# Patient Record
Sex: FEMALE | Race: WHITE | NOT HISPANIC OR LATINO | Employment: FULL TIME | URBAN - METROPOLITAN AREA
[De-identification: names, ages, dates, MRNs, and addresses within clinical notes are randomized per-mention and may not be internally consistent; named-entity substitution may affect disease eponyms.]

---

## 2022-09-08 RX ORDER — ZONISAMIDE 50 MG/1
CAPSULE ORAL 2 TIMES DAILY
COMMUNITY
End: 2023-07-14

## 2022-09-08 RX ORDER — PANTOPRAZOLE SODIUM 40 MG/1
40 TABLET, DELAYED RELEASE ORAL DAILY
COMMUNITY
End: 2022-10-27

## 2022-09-08 RX ORDER — ESCITALOPRAM OXALATE 10 MG/1
10 TABLET ORAL DAILY
COMMUNITY
End: 2022-10-27

## 2022-10-27 ENCOUNTER — OFFICE VISIT (OUTPATIENT)
Dept: NEUROLOGY | Facility: CLINIC | Age: 42
End: 2022-10-27
Payer: COMMERCIAL

## 2022-10-27 VITALS — WEIGHT: 203 LBS | DIASTOLIC BLOOD PRESSURE: 88 MMHG | SYSTOLIC BLOOD PRESSURE: 116 MMHG

## 2022-10-27 DIAGNOSIS — R20.2 PARESTHESIAS: ICD-10-CM

## 2022-10-27 DIAGNOSIS — G40.419: Primary | ICD-10-CM

## 2022-10-27 DIAGNOSIS — R41.9 COGNITIVE COMPLAINTS: ICD-10-CM

## 2022-10-27 DIAGNOSIS — G47.19 EXCESSIVE DAYTIME SLEEPINESS: ICD-10-CM

## 2022-10-27 PROCEDURE — 99205 PR OFFICE/OUTPT VISIT, NEW, LEVL V, 60-74 MIN: ICD-10-PCS | Mod: S$GLB,,, | Performed by: SPECIALIST

## 2022-10-27 PROCEDURE — 99999 PR PBB SHADOW E&M-EST. PATIENT-LVL II: CPT | Mod: PBBFAC,,, | Performed by: SPECIALIST

## 2022-10-27 PROCEDURE — 3074F PR MOST RECENT SYSTOLIC BLOOD PRESSURE < 130 MM HG: ICD-10-PCS | Mod: CPTII,S$GLB,, | Performed by: SPECIALIST

## 2022-10-27 PROCEDURE — 99999 PR PBB SHADOW E&M-EST. PATIENT-LVL II: ICD-10-PCS | Mod: PBBFAC,,, | Performed by: SPECIALIST

## 2022-10-27 PROCEDURE — 99205 OFFICE O/P NEW HI 60 MIN: CPT | Mod: S$GLB,,, | Performed by: SPECIALIST

## 2022-10-27 PROCEDURE — 3079F PR MOST RECENT DIASTOLIC BLOOD PRESSURE 80-89 MM HG: ICD-10-PCS | Mod: CPTII,S$GLB,, | Performed by: SPECIALIST

## 2022-10-27 PROCEDURE — 3079F DIAST BP 80-89 MM HG: CPT | Mod: CPTII,S$GLB,, | Performed by: SPECIALIST

## 2022-10-27 PROCEDURE — 1159F MED LIST DOCD IN RCRD: CPT | Mod: CPTII,S$GLB,, | Performed by: SPECIALIST

## 2022-10-27 PROCEDURE — 3074F SYST BP LT 130 MM HG: CPT | Mod: CPTII,S$GLB,, | Performed by: SPECIALIST

## 2022-10-27 PROCEDURE — 1159F PR MEDICATION LIST DOCUMENTED IN MEDICAL RECORD: ICD-10-PCS | Mod: CPTII,S$GLB,, | Performed by: SPECIALIST

## 2022-10-27 RX ORDER — ESCITALOPRAM OXALATE 20 MG/1
20 TABLET ORAL DAILY
COMMUNITY
Start: 2022-09-30 | End: 2023-01-17 | Stop reason: SDUPTHER

## 2022-10-27 NOTE — PROGRESS NOTES
"Subjective:       Patient ID: Letty Olmedo is a 42 y.o. female.    Chief Complaint: Pt ref by Dr. Blanca for sz, neuropathy (    HPI:            Here for sz, neuropathy eval//Pt reports onset of sz 6 yrs ago; taking zonisamide 50 mg AM, 100 mg qhs. Last known sz in summer 2021. Most recent EEG 9 months ago w Dr Lindsey. Intermittent numbness/tingling/pain to victor hugo feet, arms, upper chest. Episodes last 2 weeks feeling like "fainting". "Shadows" in peripheral vision every few months; visit w optho Feb 2021; intermittent "numbness sensation" to L eye. EMG BUE/BLE 1 yr ago w Dr. Lindsey. MRI Brain @ [facility] in  1 yr ago   Occas slurring and drops things     notes may also be on facesheet for HPI, ROS, and other sections     Review of Systems  Sleepy and naps daily; [place] 11   No sleep paralysis   When I fall it's like my brain and my muscles are fighting each other   Dreams: "things which happen in dreams actually happen in real life"   Recently dreamt water was coming out of her mouth profusely then she awakened and had slobber on her pillow     A prior two hour EEG had shown multiple episodes of seizure reportedly but her last routine EEG was reportedly normal   Vimpat was her first seizure drug           Social History     Socioeconomic History    Marital status:    Tobacco Use    Smoking status: Former     Types: Cigarettes    Smokeless tobacco: Never   Substance and Sexual Activity    Alcohol use: Yes    Drug use: Never     ----------------------------  Anxiety disorder, unspecified  Hashimoto's thyroiditis  Seizures      Current Outpatient Medications:     EScitalopram oxalate (LEXAPRO) 20 MG tablet, Take 20 mg by mouth once daily., Disp: , Rfl:     zonisamide (ZONEGRAN) 50 MG Cap, Take by mouth 2 (two) times daily., Disp: , Rfl:    Zns 50mg am 100mg pm   Objective:        Exam:   /88 (BP Location: Left arm, Patient Position: Sitting)   Wt 92.1 kg (203 lb)     General " Exam  _._unaccompanied  if accompanied, by__  body habitus_ There is no height or weight on file to calculate BMI.    mental status_alert and appropriate  oropharynx_Mallampati grade_  neck_  Heart__ RRR  extremities_  skin_    Neurological:  cortical function_    speech__  cranial nerves:  CN 2 VF_ok   fundi_ sharp discs   CN 3, 4, 6 EOMs_ok  CN 3, pupils_ok    CN 7_no lower face asymmetry  CN 8_hearing _ ok   CN 12 tongue_ok    Motor__ ok   tone:   muscle stretch reflexes__  Vib sens_  ok   Pin sens_  Plantars__ silent   tremor: _ nil   coordination: _  gait_ normal incl toes and heels and tandem   Romberg: neg     Neuroimaging:    EEG's: see above per her comments    Labs:      _._ new patient   ___ multiple issues/ diagnoses or problems  [if not enumerated in note then discussed in encounter but not documented]    complexity of data     __high ._mod   __ images and reports reviewed:  __ hx obtained from family or accompaniment:   __other studies reviewed   _._studies ordered __   __studies considered or discussed but not ordered __  _._DDx discussed __  atonic seiz vs cataplexy   Paresth due to zns vs other     Risks    _._high _mod   __ (possible or definite) neurodegenerative condition and inherent progression  __ (poss or def) autoimmune condition with possibility of flares or unexpected attack  _._ (poss or def) seiz d.o. with possib of recurr seiz's   __ cerebrovasc ds with risk of recurrence of stroke  _._ CNS meds (and/or) potentially high risk non CNS meds which may cause medical or behavioral side effects  _._ fall risk  _._ driving discussed   _._ diagnosis unclear or DDx wide making risk uncertain to high  __other:    MDM/Medical Decision Making     _._high  _moderate         Assessment/Plan:       Problem List Items Addressed This Visit          Neuro    Atonic seizures, intractable - Primary    Cognitive complaints       Other    Excessive daytime sleepiness    Paresthesias         Other comments/  follow up:        Imaging orders(if any):   Orders Placed This Encounter   Procedures    EEG,w/awake & drowsy record              Aim follow up _upcoming weeks   _Dr. PATRICK andersen EEG immed prior     Rashaad Velarde MD NALLELY

## 2022-11-02 ENCOUNTER — PROCEDURE VISIT (OUTPATIENT)
Dept: SLEEP MEDICINE | Facility: HOSPITAL | Age: 42
End: 2022-11-02
Attending: SPECIALIST
Payer: COMMERCIAL

## 2022-11-02 ENCOUNTER — OFFICE VISIT (OUTPATIENT)
Dept: NEUROLOGY | Facility: CLINIC | Age: 42
End: 2022-11-02
Payer: COMMERCIAL

## 2022-11-02 VITALS
BODY MASS INDEX: 27.5 KG/M2 | WEIGHT: 203 LBS | SYSTOLIC BLOOD PRESSURE: 118 MMHG | DIASTOLIC BLOOD PRESSURE: 82 MMHG | HEIGHT: 72 IN

## 2022-11-02 DIAGNOSIS — G40.419: Primary | ICD-10-CM

## 2022-11-02 DIAGNOSIS — R20.2 PARESTHESIAS: ICD-10-CM

## 2022-11-02 DIAGNOSIS — R41.9 COGNITIVE COMPLAINTS: ICD-10-CM

## 2022-11-02 DIAGNOSIS — G47.19 EXCESSIVE DAYTIME SLEEPINESS: ICD-10-CM

## 2022-11-02 DIAGNOSIS — G40.419: ICD-10-CM

## 2022-11-02 DIAGNOSIS — G47.33 OBSTRUCTIVE SLEEP APNEA SYNDROME: ICD-10-CM

## 2022-11-02 PROCEDURE — 3074F SYST BP LT 130 MM HG: CPT | Mod: CPTII,S$GLB,, | Performed by: SPECIALIST

## 2022-11-02 PROCEDURE — 1159F MED LIST DOCD IN RCRD: CPT | Mod: CPTII,S$GLB,, | Performed by: SPECIALIST

## 2022-11-02 PROCEDURE — 99999 PR PBB SHADOW E&M-EST. PATIENT-LVL III: CPT | Mod: PBBFAC,,, | Performed by: SPECIALIST

## 2022-11-02 PROCEDURE — 1159F PR MEDICATION LIST DOCUMENTED IN MEDICAL RECORD: ICD-10-PCS | Mod: CPTII,S$GLB,, | Performed by: SPECIALIST

## 2022-11-02 PROCEDURE — 99215 OFFICE O/P EST HI 40 MIN: CPT | Mod: S$GLB,,, | Performed by: SPECIALIST

## 2022-11-02 PROCEDURE — 3079F PR MOST RECENT DIASTOLIC BLOOD PRESSURE 80-89 MM HG: ICD-10-PCS | Mod: CPTII,S$GLB,, | Performed by: SPECIALIST

## 2022-11-02 PROCEDURE — 95819 EEG AWAKE AND ASLEEP: CPT

## 2022-11-02 PROCEDURE — 3079F DIAST BP 80-89 MM HG: CPT | Mod: CPTII,S$GLB,, | Performed by: SPECIALIST

## 2022-11-02 PROCEDURE — 95816 EEG AWAKE AND DROWSY: CPT | Mod: 26,,, | Performed by: SPECIALIST

## 2022-11-02 PROCEDURE — 99215 PR OFFICE/OUTPT VISIT, EST, LEVL V, 40-54 MIN: ICD-10-PCS | Mod: S$GLB,,, | Performed by: SPECIALIST

## 2022-11-02 PROCEDURE — 3008F BODY MASS INDEX DOCD: CPT | Mod: CPTII,S$GLB,, | Performed by: SPECIALIST

## 2022-11-02 PROCEDURE — 99999 PR PBB SHADOW E&M-EST. PATIENT-LVL III: ICD-10-PCS | Mod: PBBFAC,,, | Performed by: SPECIALIST

## 2022-11-02 PROCEDURE — 3008F PR BODY MASS INDEX (BMI) DOCUMENTED: ICD-10-PCS | Mod: CPTII,S$GLB,, | Performed by: SPECIALIST

## 2022-11-02 PROCEDURE — 95816 PR EEG,W/AWAKE & DROWSY RECORD: ICD-10-PCS | Mod: 26,,, | Performed by: SPECIALIST

## 2022-11-02 PROCEDURE — 3074F PR MOST RECENT SYSTOLIC BLOOD PRESSURE < 130 MM HG: ICD-10-PCS | Mod: CPTII,S$GLB,, | Performed by: SPECIALIST

## 2022-11-02 NOTE — PROCEDURES
EEG,w/awake & drowsy record    Date/Time: 2022 8:00 AM  Performed by: Rashaad Velarde MD  Authorized by: Rashaad Velarde MD     EEG REPORT    PATIENT: Letty Olmedo  : 1980    INTERPRETING PHYSICIAN: Rashaad Veladre     Reason for EEG: hx atonic seizures; other symptoms include episodes of speech dysf       Digital EEG reviewed.  Electrodes placed in customary 10-20 fashion.    Video recorded:   _._yes   __no     Duration of recordin   minutes     Patient  awake, drowsy, and asleep.     The resting posterior background activity consists of symmetrical background alpha activity.    Definitive lateralizing, focal, or epileptiform features were not present.  However, form fruste or equivocal features were questioned.     Activations:       HV performed and was not associated with abnormalities.       Photic performed and not associated with abnormalities.      Technical character: good     EKG: sinus    IMPRESSION: This is probably a normal awake, drowsy, and asleep EEG.  Equivocal features were present.     Comments:   The lack of focal or epileptiform features does not negate or exclude a diagnosis of seizure or epilepsy, as the sensitivity of interictal EEG may be < or equal to 50%.  An overnight psg with extended EEG montage is being sought.     Rashaad Velarde MD NALLELY    Current Outpatient Medications   Medication Instructions    EScitalopram oxalate (LEXAPRO) 20 mg, Oral, Daily    zonisamide (ZONEGRAN) 50 MG Cap Oral, 2 times daily

## 2022-11-02 NOTE — PROGRESS NOTES
Subjective:         Patient ID: Letty Olmedo is a 42 y.o. female.    Chief Complaint: sz EDS speech trouble follow up     HPI:           F/U Sz-Test Results.  (Pts boyfriend (Sukhwinder Dalton) is here with the pt today. Pt denies any new symptoms since last OV. Pt does drive. )  EEG today     notes may also be on facesheet for HPI, ROS, and other sections     Review of Systems  Last fall from possible seiz last summer   Boy says they were on hiking trip once and was debatable whether she tripped or fell   He intimates that she has stressful job but she does snore and he has heard her stop breathing             Social History     Socioeconomic History    Marital status:    Tobacco Use    Smoking status: Former     Types: Cigarettes    Smokeless tobacco: Never   Substance and Sexual Activity    Alcohol use: Yes    Drug use: Never         Current Outpatient Medications:     EScitalopram oxalate (LEXAPRO) 20 MG tablet, Take 20 mg by mouth once daily., Disp: , Rfl:     zonisamide (ZONEGRAN) 50 MG Cap, Take by mouth 2 (two) times daily., Disp: , Rfl:      Objective:      Exam  /82   Ht 6' (1.829 m)   Wt 92.1 kg (203 lb)   BMI 27.53 kg/m²     General:   if accompanied, by:_ Sukhwinder boyfriend 4 yrs duration   heart:   pharynx:    Neurological  Speech: normal   vis fields:  EOMs: normal   funduscopic:  Motor:   coord:   Gait: unaided     Neuroimaging:  Images and imaging reports reviewed.  My comments:       EEG: today equivocal possibly normal       Labs:    meds:      __._ multiple issues/ diagnoses or problems [if not enumerated in note then discussed in encounter but not documented]    complexity of data     _._high _mod   __ images and reports reviewed:  _._ hx obtained from family or accompaniment:   _._other studies reviewed EEG   _._studies ordered __   __studies considered or discussed but not ordered __  __DDx discussed __    Risks    _._high _mod   __ (possible or definite) neurodegenerative condition  and inherent progression  __ (poss or def) autoimmune condition with possibility of flares or unexpected attack  _._ (poss or def) seiz d.o. with possib of recurr seiz's   __ cerebrovasc ds with risk of recurrence of stroke  _._ CNS meds (and/or) potentially high risk non CNS meds which may cause medical or behavioral side effects  _._ fall risk  _._ driving discussed   __ diagnosis unclear or DDx wide making risk uncertain to high  __other:    MDM/Medical Decision Making     _._high  _moderate           Assessment/Plan:       Problem List Items Addressed This Visit          Neuro    Atonic seizures, intractable - Primary    Cognitive complaints       Other    Excessive daytime sleepiness    Obstructive sleep apnea syndrome       Other comments/ follow up:        Imaging orders (if any):   Orders Placed This Encounter   Procedures    Polysomnography 4 or more parameters           _._med modified  Stop am zonisamidie     Aim follow up _ weeks ahead after psg EEG montage     MD CRISTIANO LenzA

## 2022-11-16 ENCOUNTER — PROCEDURE VISIT (OUTPATIENT)
Dept: SLEEP MEDICINE | Facility: HOSPITAL | Age: 42
End: 2022-11-16
Attending: SPECIALIST
Payer: COMMERCIAL

## 2022-11-16 DIAGNOSIS — G47.33 OBSTRUCTIVE SLEEP APNEA SYNDROME: ICD-10-CM

## 2022-11-16 DIAGNOSIS — G47.19 EXCESSIVE DAYTIME SLEEPINESS: ICD-10-CM

## 2022-11-16 DIAGNOSIS — G40.419: ICD-10-CM

## 2022-11-16 DIAGNOSIS — R41.9 COGNITIVE COMPLAINTS: ICD-10-CM

## 2022-11-16 PROCEDURE — 95810 POLYSOM 6/> YRS 4/> PARAM: CPT | Mod: 26,,, | Performed by: SPECIALIST

## 2022-11-16 PROCEDURE — 95783 POLYSOM <6 YRS CPAP/BILVL: CPT

## 2022-11-16 PROCEDURE — 95810 PR POLYSOMNOGRAPHY, 4 OR MORE: ICD-10-PCS | Mod: 26,,, | Performed by: SPECIALIST

## 2022-11-16 PROCEDURE — 95811 POLYSOM 6/>YRS CPAP 4/> PARM: CPT

## 2022-11-23 ENCOUNTER — TELEPHONE (OUTPATIENT)
Dept: NEUROLOGY | Facility: CLINIC | Age: 42
End: 2022-11-23
Payer: COMMERCIAL

## 2022-11-28 NOTE — TELEPHONE ENCOUNTER
Spoke with pt; reviewed PSG results with her  AHI 1.9  Talha 91%  No seizure activity    Pt reports that night ( and most nights) she had vivid dreams and was talking during sleep.   Instructed her to take melatonin OTC  Pt reports taking 5mg Melatonin prior to sleep study  Instructed her to try up to 10mg Melatonin nightly    She has f/u w/ DrH 12/28

## 2022-11-28 NOTE — TELEPHONE ENCOUNTER
This pt has multiple other neurologic symptoms she came here for, and needs f/u with Cami. Let me know if f/u timeframe long and I can call her with results of PSG

## 2022-12-28 ENCOUNTER — OFFICE VISIT (OUTPATIENT)
Dept: NEUROLOGY | Facility: CLINIC | Age: 42
End: 2022-12-28
Payer: COMMERCIAL

## 2022-12-28 VITALS
WEIGHT: 203 LBS | BODY MASS INDEX: 27.5 KG/M2 | SYSTOLIC BLOOD PRESSURE: 112 MMHG | DIASTOLIC BLOOD PRESSURE: 78 MMHG | HEIGHT: 72 IN

## 2022-12-28 DIAGNOSIS — G40.419: Primary | ICD-10-CM

## 2022-12-28 DIAGNOSIS — R20.2 PARESTHESIAS: ICD-10-CM

## 2022-12-28 PROCEDURE — 3074F PR MOST RECENT SYSTOLIC BLOOD PRESSURE < 130 MM HG: ICD-10-PCS | Mod: CPTII,S$GLB,, | Performed by: SPECIALIST

## 2022-12-28 PROCEDURE — 1159F MED LIST DOCD IN RCRD: CPT | Mod: CPTII,S$GLB,, | Performed by: SPECIALIST

## 2022-12-28 PROCEDURE — 99999 PR PBB SHADOW E&M-EST. PATIENT-LVL III: ICD-10-PCS | Mod: PBBFAC,,, | Performed by: SPECIALIST

## 2022-12-28 PROCEDURE — 99999 PR PBB SHADOW E&M-EST. PATIENT-LVL III: CPT | Mod: PBBFAC,,, | Performed by: SPECIALIST

## 2022-12-28 PROCEDURE — 99215 OFFICE O/P EST HI 40 MIN: CPT | Mod: S$GLB,,, | Performed by: SPECIALIST

## 2022-12-28 PROCEDURE — 3078F DIAST BP <80 MM HG: CPT | Mod: CPTII,S$GLB,, | Performed by: SPECIALIST

## 2022-12-28 PROCEDURE — 3008F BODY MASS INDEX DOCD: CPT | Mod: CPTII,S$GLB,, | Performed by: SPECIALIST

## 2022-12-28 PROCEDURE — 1159F PR MEDICATION LIST DOCUMENTED IN MEDICAL RECORD: ICD-10-PCS | Mod: CPTII,S$GLB,, | Performed by: SPECIALIST

## 2022-12-28 PROCEDURE — 3078F PR MOST RECENT DIASTOLIC BLOOD PRESSURE < 80 MM HG: ICD-10-PCS | Mod: CPTII,S$GLB,, | Performed by: SPECIALIST

## 2022-12-28 PROCEDURE — 3008F PR BODY MASS INDEX (BMI) DOCUMENTED: ICD-10-PCS | Mod: CPTII,S$GLB,, | Performed by: SPECIALIST

## 2022-12-28 PROCEDURE — 99215 PR OFFICE/OUTPT VISIT, EST, LEVL V, 40-54 MIN: ICD-10-PCS | Mod: S$GLB,,, | Performed by: SPECIALIST

## 2022-12-28 PROCEDURE — 3074F SYST BP LT 130 MM HG: CPT | Mod: CPTII,S$GLB,, | Performed by: SPECIALIST

## 2022-12-28 NOTE — PROGRESS NOTES
Subjective:         Patient ID: Letty Olmedo is a 42 y.o. female.    Chief Complaint: sz    HPI:           2 month sz f/u (Here for 2 month sz f/u//Pt denies sz or sz like activity since last visit; taking zonisamide 50 mg BID.   Had sleep study done and results discussed w her via phone dialogue.   Did incr melatonin to 10 mg qhs and only awakens for restroom.   When bottom of L foot is touched, tingling to last 3 toes, denies pain.   Sporadic hip pain started yrs ago. )  Feels better since dec of zns from 50/100 to just 100hs     notes may also be on facesheet for HPI, ROS, and other sections     Review of Systems          Social History     Socioeconomic History    Marital status:    Tobacco Use    Smoking status: Former     Types: Cigarettes    Smokeless tobacco: Never   Substance and Sexual Activity    Alcohol use: Yes    Drug use: Never         Current Outpatient Medications:     EScitalopram oxalate (LEXAPRO) 20 MG tablet, Take 20 mg by mouth once daily., Disp: , Rfl:     zonisamide (ZONEGRAN) 50 MG Cap, Take by mouth 2 (two) times daily., Disp: , Rfl:      Objective:      Exam  /78 (BP Location: Left arm, Patient Position: Sitting)   Ht 6' (1.829 m)   Wt 92.1 kg (203 lb)   BMI 27.53 kg/m²     General:   if accompanied, by:_ h/partner  heart:   pharynx:    Neurological  Speech: normal   vis fields:  EOMs:  funduscopic:  Motor:   coord:   Gait: unassisted     Neuroimaging:  Images and imaging reports reviewed.  B MRI 2021     No parenchymal prob's   Stable pineal cyst 1.5cm       Labs:    meds:      __._ multiple issues/ diagnoses or problems [if not enumerated in note then discussed in encounter but not documented]    complexity of data     _._high _mod   _._ images and reports reviewed:  _._ hx obtained from family or accompaniment:   __other studies reviewed   __studies ordered __   __studies considered or discussed but not ordered __  __DDx discussed __    Risks    _._high _mod   __  (possible or definite) neurodegenerative condition and inherent progression  __ (poss or def) autoimmune condition with possibility of flares or unexpected attack  _._ (poss or def) seiz d.o. with possib of recurr seiz's   __ cerebrovasc ds with risk of recurrence of stroke  __ CNS meds (and/or) potentially high risk non CNS meds which may cause medical or behavioral side effects  __ fall risk  __ driving discussed   .__ diagnosis unclear or DDx wide making risk uncertain to high  __other:    MDM/Medical Decision Making     _._high  _moderate           Assessment/Plan:       Problem List Items Addressed This Visit          Neuro    Atonic seizures, maybe        Other    Paresthesias       Other comments/ follow up:      Decr zonisamide to just 50mg nightly     Orders Placed This Encounter   Procedures    EEG,w/awake & drowsy record    reviewed MRI images and old report she'd brought in  from old  mri; ok from seiz standpoint      __med modified    Aim follow up _1 month    Rashaad Velarde MD NALLELY

## 2023-01-06 ENCOUNTER — TELEPHONE (OUTPATIENT)
Dept: NEUROLOGY | Facility: CLINIC | Age: 43
End: 2023-01-06
Payer: COMMERCIAL

## 2023-01-06 NOTE — TELEPHONE ENCOUNTER
Patient is recently established patient with Dr. Velarde. Previous neurologist prescribed her lexapro for anxiety.     Asking if Dr. Velarde can take over medication and send refills to [facility] in [place]. Phone number for pharmacy is [phone]    Lexapro 20 mg tablet once daily.     Phone: [phone]

## 2023-01-17 RX ORDER — ESCITALOPRAM OXALATE 20 MG/1
20 TABLET ORAL DAILY
Qty: 30 TABLET | Refills: 11 | Status: SHIPPED | OUTPATIENT
Start: 2023-01-17 | End: 2024-02-08

## 2023-01-19 ENCOUNTER — PROCEDURE VISIT (OUTPATIENT)
Dept: SLEEP MEDICINE | Facility: HOSPITAL | Age: 43
End: 2023-01-19
Attending: SPECIALIST
Payer: COMMERCIAL

## 2023-01-19 ENCOUNTER — OFFICE VISIT (OUTPATIENT)
Dept: NEUROLOGY | Facility: CLINIC | Age: 43
End: 2023-01-19
Payer: COMMERCIAL

## 2023-01-19 VITALS
WEIGHT: 203 LBS | BODY MASS INDEX: 30.77 KG/M2 | HEIGHT: 68 IN | SYSTOLIC BLOOD PRESSURE: 122 MMHG | DIASTOLIC BLOOD PRESSURE: 86 MMHG

## 2023-01-19 DIAGNOSIS — Z87.898 HISTORY OF SEIZURES: Primary | ICD-10-CM

## 2023-01-19 DIAGNOSIS — G40.419: ICD-10-CM

## 2023-01-19 DIAGNOSIS — R20.2 PARESTHESIAS: ICD-10-CM

## 2023-01-19 PROCEDURE — 3008F PR BODY MASS INDEX (BMI) DOCUMENTED: ICD-10-PCS | Mod: CPTII,S$GLB,, | Performed by: SPECIALIST

## 2023-01-19 PROCEDURE — 95816 PR EEG,W/AWAKE & DROWSY RECORD: ICD-10-PCS | Mod: 26,,, | Performed by: SPECIALIST

## 2023-01-19 PROCEDURE — 3079F DIAST BP 80-89 MM HG: CPT | Mod: CPTII,S$GLB,, | Performed by: SPECIALIST

## 2023-01-19 PROCEDURE — 3079F PR MOST RECENT DIASTOLIC BLOOD PRESSURE 80-89 MM HG: ICD-10-PCS | Mod: CPTII,S$GLB,, | Performed by: SPECIALIST

## 2023-01-19 PROCEDURE — 95816 EEG AWAKE AND DROWSY: CPT

## 2023-01-19 PROCEDURE — 95816 EEG AWAKE AND DROWSY: CPT | Mod: 26,,, | Performed by: SPECIALIST

## 2023-01-19 PROCEDURE — 1159F PR MEDICATION LIST DOCUMENTED IN MEDICAL RECORD: ICD-10-PCS | Mod: CPTII,S$GLB,, | Performed by: SPECIALIST

## 2023-01-19 PROCEDURE — 3074F PR MOST RECENT SYSTOLIC BLOOD PRESSURE < 130 MM HG: ICD-10-PCS | Mod: CPTII,S$GLB,, | Performed by: SPECIALIST

## 2023-01-19 PROCEDURE — 99214 PR OFFICE/OUTPT VISIT, EST, LEVL IV, 30-39 MIN: ICD-10-PCS | Mod: S$GLB,,, | Performed by: SPECIALIST

## 2023-01-19 PROCEDURE — 99999 PR PBB SHADOW E&M-EST. PATIENT-LVL III: ICD-10-PCS | Mod: PBBFAC,,, | Performed by: SPECIALIST

## 2023-01-19 PROCEDURE — 3008F BODY MASS INDEX DOCD: CPT | Mod: CPTII,S$GLB,, | Performed by: SPECIALIST

## 2023-01-19 PROCEDURE — 99999 PR PBB SHADOW E&M-EST. PATIENT-LVL III: CPT | Mod: PBBFAC,,, | Performed by: SPECIALIST

## 2023-01-19 PROCEDURE — 99214 OFFICE O/P EST MOD 30 MIN: CPT | Mod: S$GLB,,, | Performed by: SPECIALIST

## 2023-01-19 PROCEDURE — 1159F MED LIST DOCD IN RCRD: CPT | Mod: CPTII,S$GLB,, | Performed by: SPECIALIST

## 2023-01-19 PROCEDURE — 3074F SYST BP LT 130 MM HG: CPT | Mod: CPTII,S$GLB,, | Performed by: SPECIALIST

## 2023-01-19 NOTE — PROCEDURES
"EEG,w/awake & drowsy record    Date/Time: 2023 10:00 AM  Performed by: Rashaad Velarde MD  Authorized by: Rashaad Velarde MD     EEG REPORT    PATIENT: Letty Olmedo  : 1980    DATE:     INTERPRETING PHYSICIAN: Rashaad Velarde     Reason for EEG:  episodes in the past of "falling slowly" felt to be atonic seizures   Having itching on low dose zonisamide 50mg hs        Digital EEG reviewed.  Electrodes placed in customary 10-20 fashion.    Video recorded:   _._yes   __no     Duration of recordin minutes     Patient  awake, drowsy.     The resting posterior background activity consists of symmetrical background alpha activity; not well developed or maintained but intermittently present.    Lateralizing, focal, or epileptiform features were not present.    Activations:          HV performed and was not associated with abnormalities.       Photic performed and not associated with abnormalities.      Technical character: good      EKG: nsr    IMPRESSION: This is a normal awake, drowsy EEG.     Comments:   The lack of focal or epileptiform features does not negate or exclude a diagnosis of seizure or epilepsy, as the sensitivity of interictal EEG may be < or equal to 50%.    Rashaad Velarde MD NALLELY    Current Outpatient Medications   Medication Instructions    EScitalopram oxalate (LEXAPRO) 20 mg, Oral, Daily    zonisamide (ZONEGRAN) 50 MG Cap nightly        "

## 2023-01-19 NOTE — PROGRESS NOTES
"Subjective:         Patient ID: Letty Olmedo is a 42 y.o. female.    Chief Complaint: sz    HPI:         no spells   Itching and twitching (twitching mostly L eyelid)   occas rox   Would like to be off medication if unclear she needs it     notes may also be on facesheet for HPI, ROS, and other sections     Prior visit:  2 month sz f/u (Here for 2 month sz f/u//Pt denies sz or sz like activity since last visit; taking zonisamide 50 mg BID.   Had sleep study done and results discussed w her via phone dialogue.   Did incr melatonin to 10 mg qhs and only awakens for restroom.   When bottom of L foot is touched, tingling to last 3 toes, denies pain.   Sporadic hip pain started yrs ago. )  Feels better since dec of zns from 50/100 to just 100hs     Review of Systems          Social History     Socioeconomic History    Marital status:    Tobacco Use    Smoking status: Former     Types: Cigarettes    Smokeless tobacco: Never   Substance and Sexual Activity    Alcohol use: Yes    Drug use: Never         Current Outpatient Medications:     EScitalopram oxalate (LEXAPRO) 20 MG tablet, Take 1 tablet (20 mg total) by mouth once daily., Disp: 30 tablet, Rfl: 11    zonisamide (ZONEGRAN) 50 MG Cap, Take by mouth 2 (two) times daily., Disp: , Rfl:      Objective:      Exam  /86   Ht 5' 8" (1.727 m)   Wt 92.1 kg (203 lb)   BMI 30.87 kg/m²     General:   if accompanied, by:_   heart:   pharynx:    Neurological  Speech: normal   vis fields:  EOMs:  funduscopic:  Motor:   coord:   Gait: unassisted     Neuroimaging:  Images and imaging reports reviewed last visit.  B MRI 2021   No parenchymal prob's   Stable pineal cyst 1.5cm     Today's EEG: normal   Prior routine EEG here equivocal   Prior psg here with EEG was ok     Labs:    meds:          Assessment/Plan:       Problem List Items Addressed This Visit          Neuro    Atonic seizures, doubtful        Other    Paresthesias       Other comments/ follow up:    stop " zonisamide   Report recurr spells   If recurr spells likely will ask for another EEG at fu   Considered restricting her driving but did not     Aim follow up  6w    MD CRISTIANO LenzA

## 2023-01-25 ENCOUNTER — TELEPHONE (OUTPATIENT)
Dept: OBSTETRICS AND GYNECOLOGY | Facility: CLINIC | Age: 43
End: 2023-01-25
Payer: COMMERCIAL

## 2023-01-25 NOTE — TELEPHONE ENCOUNTER
----- Message from Bibiana Holland sent at 2023  9:21 AM CST -----  Regarding: appt access  Contact: pt  Type:  Sooner Apoointment Request    Caller is requesting a sooner appointment.  Caller declined first available appointment listed below.  Caller will not accept being placed on the waitlist and is requesting a message be sent to doctor.  Name of Caller: Letty Olmedo   When is the first available appointment? 23  Symptoms: NP/bruising around  scare, break through bleeding, abd pain   Would the patient rather a call back or a response via [facility]?  Call back   Best Call Back Number: [phone]  Additional Information:  Pt last  was 2017.

## 2023-01-25 NOTE — TELEPHONE ENCOUNTER
Spoke to new patient and scheduled next available. Advised to contact pcp or urgent care for current concerns.

## 2023-07-14 ENCOUNTER — OFFICE VISIT (OUTPATIENT)
Dept: OBSTETRICS AND GYNECOLOGY | Facility: CLINIC | Age: 43
End: 2023-07-14
Payer: COMMERCIAL

## 2023-07-14 VITALS
WEIGHT: 215 LBS | HEIGHT: 68 IN | SYSTOLIC BLOOD PRESSURE: 135 MMHG | BODY MASS INDEX: 32.58 KG/M2 | HEART RATE: 64 BPM | DIASTOLIC BLOOD PRESSURE: 82 MMHG

## 2023-07-14 DIAGNOSIS — Z01.419 WELL WOMAN EXAM WITH ROUTINE GYNECOLOGICAL EXAM: Primary | ICD-10-CM

## 2023-07-14 DIAGNOSIS — N81.6 RECTOCELE: ICD-10-CM

## 2023-07-14 DIAGNOSIS — Z12.31 BREAST CANCER SCREENING BY MAMMOGRAM: ICD-10-CM

## 2023-07-14 DIAGNOSIS — N39.3 SUI (STRESS URINARY INCONTINENCE, FEMALE): ICD-10-CM

## 2023-07-14 DIAGNOSIS — R10.2 PELVIC PAIN: ICD-10-CM

## 2023-07-14 DIAGNOSIS — N92.0 MENORRHAGIA WITH REGULAR CYCLE: ICD-10-CM

## 2023-07-14 PROCEDURE — 99386 PREV VISIT NEW AGE 40-64: CPT | Mod: S$GLB,,, | Performed by: OBSTETRICS & GYNECOLOGY

## 2023-07-14 PROCEDURE — 3075F PR MOST RECENT SYSTOLIC BLOOD PRESS GE 130-139MM HG: ICD-10-PCS | Mod: CPTII,S$GLB,, | Performed by: OBSTETRICS & GYNECOLOGY

## 2023-07-14 PROCEDURE — 3075F SYST BP GE 130 - 139MM HG: CPT | Mod: CPTII,S$GLB,, | Performed by: OBSTETRICS & GYNECOLOGY

## 2023-07-14 PROCEDURE — 3079F PR MOST RECENT DIASTOLIC BLOOD PRESSURE 80-89 MM HG: ICD-10-PCS | Mod: CPTII,S$GLB,, | Performed by: OBSTETRICS & GYNECOLOGY

## 2023-07-14 PROCEDURE — 3008F PR BODY MASS INDEX (BMI) DOCUMENTED: ICD-10-PCS | Mod: CPTII,S$GLB,, | Performed by: OBSTETRICS & GYNECOLOGY

## 2023-07-14 PROCEDURE — 1159F PR MEDICATION LIST DOCUMENTED IN MEDICAL RECORD: ICD-10-PCS | Mod: CPTII,S$GLB,, | Performed by: OBSTETRICS & GYNECOLOGY

## 2023-07-14 PROCEDURE — 3008F BODY MASS INDEX DOCD: CPT | Mod: CPTII,S$GLB,, | Performed by: OBSTETRICS & GYNECOLOGY

## 2023-07-14 PROCEDURE — 3079F DIAST BP 80-89 MM HG: CPT | Mod: CPTII,S$GLB,, | Performed by: OBSTETRICS & GYNECOLOGY

## 2023-07-14 PROCEDURE — 1159F MED LIST DOCD IN RCRD: CPT | Mod: CPTII,S$GLB,, | Performed by: OBSTETRICS & GYNECOLOGY

## 2023-07-14 PROCEDURE — 99386 PR PREVENTIVE VISIT,NEW,40-64: ICD-10-PCS | Mod: S$GLB,,, | Performed by: OBSTETRICS & GYNECOLOGY

## 2023-07-14 NOTE — PROGRESS NOTES
Subjective:       Patient ID: Letty Olmedo is a 43 y.o. female.    Chief Complaint:  Well Woman (PATIENT STATES SHE HAD AN ULTRASOUND WITH PREVIOUS PROVIDER THAT SHOWED AN OVARIAN CYST. SHE HAS BEEN HAVING SOME RIGHT SIDED PAIN. /UNUSUAL DISCHARGE A COUPLE TIMES A MONTH. CLOTS WITH CYCLES. )      History of Present Illness  She is doing well.  No new health issues,  No abnormal bleeding, No GI concerns,   She has very large clots the size of a tennis ball.          she feels deep dyspareunia. She feels like he is hitting something.   She has SWAPNIL with activity.  She can hold her urine.    She had a tubal with her last c section.  Her first child was     she had an u/s a while ago that showed an ovarian cyst.      She has a lot of pain on the right side and this has been on going for  a few months.   She does exercise with  spinning.   .   HPI      GYN & OB History  Patient's last menstrual period was 2023.     Date of Last Pap: No result found    OB History   No obstetric history on file.       Review of Systems  Review of Systems   Constitutional:  Negative for activity change.   Eyes:  Negative for visual disturbance.   Respiratory:  Negative for shortness of breath.    Cardiovascular:  Negative for chest pain.   Gastrointestinal:  Negative for abdominal pain.   Genitourinary:  Positive for bladder incontinence, dyspareunia, menorrhagia and pelvic pain. Negative for vaginal bleeding.        No abnormal vaginal bleeding   Musculoskeletal:  Negative for back pain.   Integumentary:  Negative for rash and breast mass.   Neurological:  Negative for numbness.   Psychiatric/Behavioral:          No mood disturbance or changes    Breast: Negative for mass          Objective:    Physical Exam:   Constitutional: She is oriented to person, place, and time. She appears well-developed. She is cooperative.    HENT:   Head: Normocephalic.     Neck: Trachea normal. No thyromegaly present.    Cardiovascular:   Normal rate, regular rhythm and normal heart sounds.             Pulmonary/Chest: Effort normal and breath sounds normal. Right breast exhibits no mass, no nipple discharge and no skin change. Left breast exhibits no mass, no nipple discharge and no skin change.        Abdominal: Soft. There is no abdominal tenderness. There is no rebound and no guarding.     Genitourinary:    Vagina, uterus and left adnexa normal.      Pelvic exam was performed with patient supine.   Labial bartholins normal.There is no lesion on the right labia. There is no lesion on the left labia. Cervix is normal. Right adnexum displays tenderness. Right adnexum displays no mass. Left adnexum displays no mass and no tenderness. Cervix exhibits no discharge. Uterus is not enlarged and not tender. Urethral Meatus exhibits: prolapsed (she does have SWAPNIL with cough that i noted)               Lymphadenopathy:        Head (right side): No submental and no submandibular adenopathy present.        Head (left side): No submental and no submandibular adenopathy present.     She has no cervical adenopathy.    Neurological: She is alert and oriented to person, place, and time.    Skin: Skin is warm.    Psychiatric: She has a normal mood and affect. Her speech is normal and behavior is normal. Thought content normal.        Assessment:        1. Well woman exam with routine gynecological exam    2. Breast cancer screening by mammogram    3. Menorrhagia with regular cycle    4. Pelvic pain    5. SWAPNIL (stress urinary incontinence, female)    6. Rectocele                 Plan:         [facility]   bilateral fallopian tube.   Mid urethra sling    post repair and cystoscopy       Pap discussed will return for her annual     Pt is aware we call all results. If she does not hear from our office regarding her result within a week of having a study or procedure performed she is to call the office so that we can research the result for her.  Birth control  discussed  Chaperone was present

## 2023-07-17 ENCOUNTER — PROCEDURE VISIT (OUTPATIENT)
Dept: OBSTETRICS AND GYNECOLOGY | Facility: CLINIC | Age: 43
End: 2023-07-17
Payer: COMMERCIAL

## 2023-07-17 ENCOUNTER — TELEPHONE (OUTPATIENT)
Dept: OBSTETRICS AND GYNECOLOGY | Facility: CLINIC | Age: 43
End: 2023-07-17
Payer: COMMERCIAL

## 2023-07-17 DIAGNOSIS — N81.6 RECTOCELE: ICD-10-CM

## 2023-07-17 DIAGNOSIS — R10.2 PELVIC PAIN: ICD-10-CM

## 2023-07-17 DIAGNOSIS — N39.3 SUI (STRESS URINARY INCONTINENCE, FEMALE): ICD-10-CM

## 2023-07-17 DIAGNOSIS — N92.0 MENORRHAGIA WITH REGULAR CYCLE: ICD-10-CM

## 2023-07-17 LAB — Lab: NORMAL

## 2023-07-17 PROCEDURE — 76830 TRANSVAGINAL US NON-OB: CPT | Mod: S$GLB,,, | Performed by: OBSTETRICS & GYNECOLOGY

## 2023-07-17 PROCEDURE — 76830 US OB/GYN PROCEDURE (VIEWPOINT): ICD-10-PCS | Mod: S$GLB,,, | Performed by: OBSTETRICS & GYNECOLOGY

## 2023-07-17 NOTE — TELEPHONE ENCOUNTER
----- Message from Jaren Dean sent at 7/17/2023  2:10 PM CDT -----  Contact: Pt  Pt is calling to see when she should expect her US results and can be reached at [phone]//thanks/dbw

## 2023-07-17 NOTE — TELEPHONE ENCOUNTER
Spoke to patient about results. She is wanting to move forward with surgery.     PLEASE ORDER SURGERY.

## 2023-08-16 ENCOUNTER — TELEPHONE (OUTPATIENT)
Dept: OBSTETRICS AND GYNECOLOGY | Facility: CLINIC | Age: 43
End: 2023-08-16
Payer: COMMERCIAL

## 2023-08-16 NOTE — TELEPHONE ENCOUNTER
Patient requested a call back regarding her cycle. Called patient, she states that her cycle is about 2 weeks late and she had night sweats last night. Reassured patient that these symptoms are not concerning and to continue to monitor at this time. She is scheduled for a hysterectomy in November. Advised that if the night sweats continue or she develops any further menopausal symptoms, there are hormonal and non hormonal medication options. Patient voiced understanding.

## 2023-08-23 ENCOUNTER — TELEPHONE (OUTPATIENT)
Dept: OBSTETRICS AND GYNECOLOGY | Facility: CLINIC | Age: 43
End: 2023-08-23
Payer: COMMERCIAL

## 2023-08-23 NOTE — TELEPHONE ENCOUNTER
----- Message from Marlene Louie sent at 8/23/2023  1:28 PM CDT -----  Contact: self  Pt is calling in regards to 3 week late cycle . Please call pt at [phone] .

## 2023-08-23 NOTE — TELEPHONE ENCOUNTER
Phone call returned.  A child answered the phone and I did not get to speak to the patient  I will attempt to reach tomorrow morning.

## 2023-08-24 NOTE — TELEPHONE ENCOUNTER
Please advise:    Patient called reporting  she is three weeks late for her cycle.  She is scheduled for a Hysterectomy in November. She is interested in hormone medication.     Allergy: Motrin    [facility]       August 16, 2023   Trev Vergara LPN  [facility]   8/16/23 10:54 AM   Note   Patient requested a call back regarding her cycle. Called patient, she states that her cycle is about 2 weeks late and she had night sweats last night. Reassured patient that these symptoms are not concerning and to continue to monitor at this time. She is scheduled for a hysterectomy in November. Advised that if the night sweats continue or she develops any further menopausal symptoms, there are hormonal and non hormonal medication options. Patient voiced understanding

## 2023-08-25 DIAGNOSIS — N91.2 AMENORRHEA: Primary | ICD-10-CM

## 2023-08-25 LAB
FSH: 6.15 MIU/ML
LH: 6.37 MIU/ML
T4, FREE: 1.12 NG/DL (ref 0.93–1.7)
TSH SERPL DL<=0.005 MIU/L-ACNC: 1.3 UIU/ML (ref 0.27–4.2)

## 2023-08-25 RX ORDER — MEDROXYPROGESTERONE ACETATE 10 MG/1
10 TABLET ORAL DAILY
Qty: 15 TABLET | Refills: 0 | Status: SHIPPED | OUTPATIENT
Start: 2023-08-25 | End: 2023-09-11

## 2023-08-25 NOTE — TELEPHONE ENCOUNTER
Phone call returned to patient  Dr. Yanes reviewed her message.  He advised starting Provera and Labs ordered TSH, Free T4  FSH and LH  Patient advised to have labs drawn at [facility]  If she does not hear from our office in a couple of days please reach out to us   Patient acknowledged with verbal understanding

## 2023-08-30 ENCOUNTER — TELEPHONE (OUTPATIENT)
Dept: OBSTETRICS AND GYNECOLOGY | Facility: CLINIC | Age: 43
End: 2023-08-30
Payer: COMMERCIAL

## 2023-08-30 NOTE — TELEPHONE ENCOUNTER
----- Message from Jaren Dean sent at 8/30/2023  9:01 AM CDT -----  Contact: Pt  Pt is calling rg her cycle still hasn't come and was supposed to come on at the 1st of the mnth and should she still tke the hormones that were given to the pt and can be reached at [phone]//thanks/dbw

## 2023-08-30 NOTE — TELEPHONE ENCOUNTER
Spoke to patient. Advised that there are a lot of factors that can affect her menstrual cycle. She is scheduled for her hysterectomy in 1 month.

## 2023-09-09 DIAGNOSIS — N91.2 AMENORRHEA: ICD-10-CM

## 2023-09-11 RX ORDER — MEDROXYPROGESTERONE ACETATE 10 MG/1
TABLET ORAL
Qty: 15 TABLET | Refills: 0 | Status: SHIPPED | OUTPATIENT
Start: 2023-09-11 | End: 2023-09-29

## 2023-09-11 NOTE — TELEPHONE ENCOUNTER
Patient requesting refill of medication. Allergies reviewed. Medication pending. Pharmacy up to date. Please review.

## 2023-09-29 DIAGNOSIS — N91.2 AMENORRHEA: ICD-10-CM

## 2023-09-29 RX ORDER — MEDROXYPROGESTERONE ACETATE 10 MG/1
TABLET ORAL
Qty: 15 TABLET | Refills: 0 | Status: SHIPPED | OUTPATIENT
Start: 2023-09-29 | End: 2023-11-15

## 2023-10-09 ENCOUNTER — OFFICE VISIT (OUTPATIENT)
Dept: NEUROLOGY | Facility: CLINIC | Age: 43
End: 2023-10-09
Payer: COMMERCIAL

## 2023-10-09 ENCOUNTER — TELEPHONE (OUTPATIENT)
Dept: SLEEP MEDICINE | Facility: HOSPITAL | Age: 43
End: 2023-10-09
Payer: COMMERCIAL

## 2023-10-09 VITALS
SYSTOLIC BLOOD PRESSURE: 122 MMHG | BODY MASS INDEX: 32.58 KG/M2 | HEIGHT: 68 IN | DIASTOLIC BLOOD PRESSURE: 78 MMHG | WEIGHT: 215 LBS

## 2023-10-09 DIAGNOSIS — Z87.898 HISTORY OF SEIZURE: Primary | ICD-10-CM

## 2023-10-09 DIAGNOSIS — Z87.898 HISTORY OF SEIZURES: Primary | ICD-10-CM

## 2023-10-09 PROCEDURE — 3074F SYST BP LT 130 MM HG: CPT | Mod: CPTII,S$GLB,, | Performed by: NURSE PRACTITIONER

## 2023-10-09 PROCEDURE — 3078F DIAST BP <80 MM HG: CPT | Mod: CPTII,S$GLB,, | Performed by: NURSE PRACTITIONER

## 2023-10-09 PROCEDURE — 1159F PR MEDICATION LIST DOCUMENTED IN MEDICAL RECORD: ICD-10-PCS | Mod: CPTII,S$GLB,, | Performed by: NURSE PRACTITIONER

## 2023-10-09 PROCEDURE — 3078F PR MOST RECENT DIASTOLIC BLOOD PRESSURE < 80 MM HG: ICD-10-PCS | Mod: CPTII,S$GLB,, | Performed by: NURSE PRACTITIONER

## 2023-10-09 PROCEDURE — 99214 PR OFFICE/OUTPT VISIT, EST, LEVL IV, 30-39 MIN: ICD-10-PCS | Mod: S$GLB,,, | Performed by: NURSE PRACTITIONER

## 2023-10-09 PROCEDURE — 3074F PR MOST RECENT SYSTOLIC BLOOD PRESSURE < 130 MM HG: ICD-10-PCS | Mod: CPTII,S$GLB,, | Performed by: NURSE PRACTITIONER

## 2023-10-09 PROCEDURE — 99999 PR PBB SHADOW E&M-EST. PATIENT-LVL III: ICD-10-PCS | Mod: PBBFAC,,, | Performed by: NURSE PRACTITIONER

## 2023-10-09 PROCEDURE — 3008F PR BODY MASS INDEX (BMI) DOCUMENTED: ICD-10-PCS | Mod: CPTII,S$GLB,, | Performed by: NURSE PRACTITIONER

## 2023-10-09 PROCEDURE — 3008F BODY MASS INDEX DOCD: CPT | Mod: CPTII,S$GLB,, | Performed by: NURSE PRACTITIONER

## 2023-10-09 PROCEDURE — 99999 PR PBB SHADOW E&M-EST. PATIENT-LVL III: CPT | Mod: PBBFAC,,, | Performed by: NURSE PRACTITIONER

## 2023-10-09 PROCEDURE — 1159F MED LIST DOCD IN RCRD: CPT | Mod: CPTII,S$GLB,, | Performed by: NURSE PRACTITIONER

## 2023-10-09 PROCEDURE — 99214 OFFICE O/P EST MOD 30 MIN: CPT | Mod: S$GLB,,, | Performed by: NURSE PRACTITIONER

## 2023-10-09 NOTE — PROGRESS NOTES
"Neurology  Established Patient   SUBJECTIVE:    Patient ID: Letty Olmedo , 43 y.o.    Past Medical History:   Diagnosis Date    Anxiety disorder, unspecified     Hashimoto's thyroiditis     Seizures        Past Surgical History:   Procedure Laterality Date     SECTION      COLONOSCOPY      HEMORRHOID SURGERY      TONSILLECTOMY      TUBAL LIGATION      UMBILICAL HERNIA REPAIR         Review of patient's allergies indicates:   Allergen Reactions    Motrin [ibuprofen] Hives       Chief Complaint: History of seizure f/u (seizure med stopped in 2023)    History of Present Illness:     Seizure follow up    Has been off Zonisamide since 2023. Denies seizure-like activity    Over the past 1-2 weeks:  Having episodes of lightheadedness that last 1-2 seconds at a time. Feels its stress related  Denies loss of consciousness  Occurs "all day every day"     Pt scheduled to have a Hysterectomy on 2023.    Admits stressors    Admits difficulty falling asleep. Takes Melatonin 10mg nightly     Review of Systems - as per HPI, otherwise a balanced 10 systems review is negative.      Current Medications:  Current Outpatient Medications   Medication Instructions    EScitalopram oxalate (LEXAPRO) 20 mg, Oral, Daily    medroxyPROGESTERone (PROVERA) 10 MG tablet TAKE 1 TABLET(10 MG) BY MOUTH EVERY DAY FOR 15 DAYS         OBJECTIVE:    Vitals:  /78   Ht 5' 8" (1.727 m)   Wt 97.5 kg (215 lb)   BMI 32.69 kg/m²      Physical Exam:  Constitutional  she appears well-developed and well-nourished. she is well groomed.    Appearance - well appearing, no apparent distress, unassisted  Heart - RRR auscultated without murmur  Lungs - CTA   Skin- no obvious lesions noted    Neurologic  Cortical function - The patient is alert, attentive, and oriented  Speech - clear   Cranial nerves:  CN 3, 4, 6 EOMs - normal. No ptosis or lateral gaze deviation  CN 7 - no face asymmetry; normal eye closure and smile  CN 8 - hearing is " grossly normal  Motor - grossly normal  Gait - unassisted; posture upright. gait is steady with normal steps    Review of Data:      Neuroimaging:  Images and imaging reports reviewed last visit.  B MRI 2021   No parenchymal prob's   Stable pineal cyst 1.5cm      1/19/23 EEG: normal   Prior routine EEG here equivocal   Prior psg here with EEG was ok     11/2022- Routine EEG nl    Labs:  Refill on 08/25/2023   Component Date Value Ref Range Status    TSH 08/25/2023 1.30  0.27 - 4.20 uIU/mL Final    T4, Free 08/25/2023 1.12  0.93 - 1.70 ng/dL Final    LH 08/25/2023 6.37  mIU/mL Final    FSH 08/25/2023 6.15  mIU/mL Final   Office Visit on 07/14/2023   Component Date Value Ref Range Status    Disclaimer 07/14/2023    Final                    Value:DISCLAIMER: Annual Pap smears are the best means now available for the early detection of cervical cancer.  There is no guarantee, however, that a particular Pap smear will  diseased cells that are present.  Thus, false negative reports may   occasionally occur.            ASSESSMENT /PLAN:    Problem List Items Addressed This Visit          Neuro    History of seizures - Primary    Will order 24 hour EEG (since having these episodes daily)    Follow up TBD           Questions and concerns were sought and answered to the patient's stated verbal satisfaction.    The patient verbalizes understanding and agreement with the above stated treatment plan.   Dr. Velarde was available during today's encounter.     Items discussed include acute and/or chronic neurological, sleep, or other issues and their attendant differential diagnoses.  Potential for additional testing, treatment options, and prognosis also discussed.    _*__single dx ___multiple issues/ diagnoses  ___ low __ mod _*__ high complexity of data  ___low _*_mod ___ high risks     Medical Decision Making (MDM) used for CPT choice:  ___low  _*__moderate  ____high        ROMEO Bush  [facility]  [place]  [phone]

## 2023-10-10 ENCOUNTER — PROCEDURE VISIT (OUTPATIENT)
Dept: SLEEP MEDICINE | Facility: HOSPITAL | Age: 43
End: 2023-10-10
Attending: NURSE PRACTITIONER
Payer: COMMERCIAL

## 2023-10-10 DIAGNOSIS — R56.9 CONVULSION: Primary | ICD-10-CM

## 2023-10-10 DIAGNOSIS — G40.419: ICD-10-CM

## 2023-10-10 DIAGNOSIS — Z87.898 HISTORY OF SEIZURE: ICD-10-CM

## 2023-10-10 DIAGNOSIS — R20.2 PARESTHESIAS: ICD-10-CM

## 2023-10-10 DIAGNOSIS — R41.9 COGNITIVE COMPLAINTS: ICD-10-CM

## 2023-10-10 DIAGNOSIS — G47.19 EXCESSIVE DAYTIME SLEEPINESS: ICD-10-CM

## 2023-10-10 PROCEDURE — 95708 EEG WO VID EA 12-26HR UNMNTR: CPT

## 2023-10-10 PROCEDURE — 95719 PR EEG, W/O VIDEO, CONT RECORD, I&R, >12<26 HRS: ICD-10-PCS | Mod: ,,, | Performed by: SPECIALIST

## 2023-10-10 PROCEDURE — 95719 EEG PHYS/QHP EA INCR W/O VID: CPT | Mod: ,,, | Performed by: SPECIALIST

## 2023-10-19 NOTE — PROCEDURES
Ambulatory EEG    Date/Time: 10/10/2023 1:30 PM    Performed by: Rashaad Velarde MD  Authorized by: Jillian Beck, [facility]      Ambulatory / Extended EEG Report    PATIENT: Letty Olmedo  : 1980    INTERPRETING PHYSICIAN: Rashaad Velarde     Reason for EEG: seizures, atonic seizures, paresthesias, cognitive complaints        Digital prolonged ambulatory EEG without video reviewed.  Electrodes placed in customary 10-20 fashion.    Duration of recordin  hours  53   minutes.     Patient  awake, drowsy, and asleep.    Sleep episodes are as follows:  Naps:  Nocturnal sleep: 1008p to 0640a    The resting posterior background activity consists of symmetrical background alpha activity.    Lateralizing, focal, or epileptiform features were not present.    Activations (HV photic stimulation) not performed.     Technical character:  Electrode artifact in the last couple of hours of the study but the patient had already been awake.  Additionally, patient button pushes number 55 I suspect some of these occurred while she was exercising on her Peloton.     EKG:     Patient kept a diary.    __Pertinent entries include:  _*_No pertinent clinical symptoms recorded.     Patient alert button pushed.  EEG at these times showed:  Either normal EEG or only artifact        IMPRESSION: This is a normal awake, drowsy, and asleep extended/ 24 hour ambulatory EEG.     Comments:   The lack of focal or epileptiform features, even on an ambulatory tracing, does not negate or exclude a diagnosis of seizure or epilepsy.    Rashaad Velarde MD [facility]    Current Outpatient Medications   Medication Instructions    EScitalopram oxalate (LEXAPRO) 20 mg, Oral, Daily    medroxyPROGESTERone (PROVERA) 10 MG tablet TAKE 1 TABLET(10 MG) BY MOUTH EVERY DAY FOR 15 DAYS

## 2023-10-20 ENCOUNTER — TELEPHONE (OUTPATIENT)
Dept: NEUROLOGY | Facility: CLINIC | Age: 43
End: 2023-10-20
Payer: COMMERCIAL

## 2023-10-20 NOTE — TELEPHONE ENCOUNTER
Patient requesting call with results for 24 hr EEG. States saw results were uploaded in portal but unable to view.     Results available in Epic.    Phone: [phone]

## 2023-10-23 ENCOUNTER — PATIENT MESSAGE (OUTPATIENT)
Dept: NEUROLOGY | Facility: CLINIC | Age: 43
End: 2023-10-23
Payer: COMMERCIAL

## 2023-11-02 ENCOUNTER — OFFICE VISIT (OUTPATIENT)
Dept: OBSTETRICS AND GYNECOLOGY | Facility: CLINIC | Age: 43
End: 2023-11-02
Payer: COMMERCIAL

## 2023-11-02 VITALS
SYSTOLIC BLOOD PRESSURE: 132 MMHG | HEART RATE: 69 BPM | HEIGHT: 68 IN | BODY MASS INDEX: 32.58 KG/M2 | WEIGHT: 215 LBS | DIASTOLIC BLOOD PRESSURE: 83 MMHG

## 2023-11-02 DIAGNOSIS — N39.3 SUI (STRESS URINARY INCONTINENCE, FEMALE): ICD-10-CM

## 2023-11-02 DIAGNOSIS — N81.6 RECTOCELE: ICD-10-CM

## 2023-11-02 DIAGNOSIS — R10.2 PELVIC PAIN: ICD-10-CM

## 2023-11-02 DIAGNOSIS — Z98.890 POST-OPERATIVE STATE: ICD-10-CM

## 2023-11-02 DIAGNOSIS — N92.0 MENORRHAGIA WITH REGULAR CYCLE: Primary | ICD-10-CM

## 2023-11-02 LAB
APPEARANCE, UA: CLEAR
B-HCG UR QL: NEGATIVE
BASOPHILS NFR BLD: 0.9 % (ref 0–3)
BILIRUB UR QL STRIP: NEGATIVE MG/DL
COLOR UR: ABNORMAL
EOSINOPHIL NFR BLD: 1.9 % (ref 1–3)
ERYTHROCYTE [DISTWIDTH] IN BLOOD BY AUTOMATED COUNT: 13.1 % (ref 12.5–18)
GLUCOSE (UA): NORMAL MG/DL
HCT VFR BLD AUTO: 38.9 % (ref 37–47)
HGB BLD-MCNC: 13 G/DL (ref 12–16)
HGB UR QL STRIP: 25 /UL
KETONES UR QL STRIP: NEGATIVE MG/DL
LEUKOCYTE ESTERASE UR QL STRIP: NEGATIVE /UL
LYMPHOCYTES NFR BLD: 32.8 % (ref 25–40)
MCH RBC QN AUTO: 26.6 PG (ref 27–31.2)
MCHC RBC AUTO-ENTMCNC: 33.4 G/DL (ref 31.8–35.4)
MCV RBC AUTO: 79.7 FL (ref 80–97)
MONOCYTES NFR BLD: 7.2 % (ref 1–15)
NEUTROPHILS # BLD AUTO: 3.79 10*3/UL (ref 1.8–7.7)
NEUTROPHILS NFR BLD: 56.8 % (ref 37–80)
NITRITE UR QL STRIP: NEGATIVE
NUCLEATED RED BLOOD CELLS: 0 %
PH UR STRIP: 6.5 PH (ref 5–9)
PLATELETS: 283 10*3/UL (ref 142–424)
PROT UR QL STRIP: NEGATIVE MG/DL
RBC # BLD AUTO: 4.88 10*6/UL (ref 4.2–5.4)
SP GR UR STRIP: 1.01 (ref 1–1.03)
SPECIMEN COLLECTION METHOD, URINE: ABNORMAL
UROBILINOGEN UR STRIP-ACNC: NORMAL MG/DL
WBC # BLD: 6.7 10*3/UL (ref 4.6–10.2)

## 2023-11-02 PROCEDURE — 3008F PR BODY MASS INDEX (BMI) DOCUMENTED: ICD-10-PCS | Mod: CPTII,S$GLB,, | Performed by: OBSTETRICS & GYNECOLOGY

## 2023-11-02 PROCEDURE — 3079F DIAST BP 80-89 MM HG: CPT | Mod: CPTII,S$GLB,, | Performed by: OBSTETRICS & GYNECOLOGY

## 2023-11-02 PROCEDURE — 1159F MED LIST DOCD IN RCRD: CPT | Mod: CPTII,S$GLB,, | Performed by: OBSTETRICS & GYNECOLOGY

## 2023-11-02 PROCEDURE — 3079F PR MOST RECENT DIASTOLIC BLOOD PRESSURE 80-89 MM HG: ICD-10-PCS | Mod: CPTII,S$GLB,, | Performed by: OBSTETRICS & GYNECOLOGY

## 2023-11-02 PROCEDURE — 3008F BODY MASS INDEX DOCD: CPT | Mod: CPTII,S$GLB,, | Performed by: OBSTETRICS & GYNECOLOGY

## 2023-11-02 PROCEDURE — 99213 OFFICE O/P EST LOW 20 MIN: CPT | Mod: S$GLB,,, | Performed by: OBSTETRICS & GYNECOLOGY

## 2023-11-02 PROCEDURE — 3075F SYST BP GE 130 - 139MM HG: CPT | Mod: CPTII,S$GLB,, | Performed by: OBSTETRICS & GYNECOLOGY

## 2023-11-02 PROCEDURE — 3075F PR MOST RECENT SYSTOLIC BLOOD PRESS GE 130-139MM HG: ICD-10-PCS | Mod: CPTII,S$GLB,, | Performed by: OBSTETRICS & GYNECOLOGY

## 2023-11-02 PROCEDURE — 99213 PR OFFICE/OUTPT VISIT, EST, LEVL III, 20-29 MIN: ICD-10-PCS | Mod: S$GLB,,, | Performed by: OBSTETRICS & GYNECOLOGY

## 2023-11-02 PROCEDURE — 1159F PR MEDICATION LIST DOCUMENTED IN MEDICAL RECORD: ICD-10-PCS | Mod: CPTII,S$GLB,, | Performed by: OBSTETRICS & GYNECOLOGY

## 2023-11-02 RX ORDER — PROMETHAZINE HYDROCHLORIDE 12.5 MG/1
12.5 TABLET ORAL 4 TIMES DAILY
Qty: 12 TABLET | Refills: 1 | Status: SHIPPED | OUTPATIENT
Start: 2023-11-02 | End: 2023-11-15

## 2023-11-02 RX ORDER — OXYCODONE AND ACETAMINOPHEN 5; 325 MG/1; MG/1
1 TABLET ORAL EVERY 4 HOURS PRN
Qty: 28 TABLET | Refills: 0 | Status: SHIPPED | OUTPATIENT
Start: 2023-11-02 | End: 2023-11-15

## 2023-11-02 RX ORDER — IBUPROFEN 600 MG/1
600 TABLET ORAL 3 TIMES DAILY
Qty: 18 TABLET | Refills: 1 | Status: SHIPPED | OUTPATIENT
Start: 2023-11-02 | End: 2023-11-15

## 2023-11-02 NOTE — PROGRESS NOTES
Subjective   Patient ID: Letty Olmedo is a 43 y.o. female.     Chief Complaint:  Well Woman (PATIENT STATES SHE HAD AN ULTRASOUND WITH PREVIOUS PROVIDER THAT SHOWED AN OVARIAN CYST. SHE HAS BEEN HAVING SOME RIGHT SIDED PAIN. /UNUSUAL DISCHARGE A COUPLE TIMES A MONTH. CLOTS WITH CYCLES. )        History of Present Illness  She is doing well.  No new health issues,  No abnormal bleeding, No GI concerns,   She has very large clots the size of a tennis ball.          she feels deep dyspareunia. She feels like he is hitting something.   She has SWAPNIL with activity.  She can hold her urine.    She had a tubal with her last c section.  Her first child was     she had an u/s a while ago that showed an ovarian cyst.      She has a lot of pain on the right side and this has been on going for  a few months.   She does exercise with  spinning.   .   HPI        GYN & OB History  Patient's last menstrual period was 2023.      Date of Last Pap: No result found     OB History   No obstetric history on file.         Review of Systems  Review of Systems   Constitutional:  Negative for activity change.   Eyes:  Negative for visual disturbance.   Respiratory:  Negative for shortness of breath.    Cardiovascular:  Negative for chest pain.   Gastrointestinal:  Negative for abdominal pain.   Genitourinary:  Positive for bladder incontinence, dyspareunia, menorrhagia and pelvic pain. Negative for vaginal bleeding.        No abnormal vaginal bleeding   Musculoskeletal:  Negative for back pain.   Integumentary:  Negative for rash and breast mass.   Neurological:  Negative for numbness.   Psychiatric/Behavioral:          No mood disturbance or changes    Breast: Negative for mass              Objective:      Objective   Physical Exam:   Constitutional: She is oriented to person, place, and time. She appears well-developed. She is cooperative.    HENT:   Head: Normocephalic.     Neck: Trachea normal. No thyromegaly  present.    Cardiovascular:  Normal rate, regular rhythm and normal heart sounds.             Pulmonary/Chest: Effort normal and breath sounds normal. Right breast exhibits no mass, no nipple discharge and no skin change. Left breast exhibits no mass, no nipple discharge and no skin change.         Abdominal: Soft. There is no abdominal tenderness. There is no rebound and no guarding.     Genitourinary:    Vagina, uterus and left adnexa normal.      Pelvic exam was performed with patient supine.   Labial bartholins normal.There is no lesion on the right labia. There is no lesion on the left labia. Cervix is normal. Right adnexum displays tenderness. Right adnexum displays no mass. Left adnexum displays no mass and no tenderness. Cervix exhibits no discharge. Uterus is not enlarged and not tender. Urethral Meatus exhibits: prolapsed (she does have SWAPNIL with cough that i noted)               Lymphadenopathy:        Head (right side): No submental and no submandibular adenopathy present.        Head (left side): No submental and no submandibular adenopathy present.     She has no cervical adenopathy.    Neurological: She is alert and oriented to person, place, and time.    Skin: Skin is warm.    Psychiatric: She has a normal mood and affect. Her speech is normal and behavior is normal. Thought content normal.          Assessment:         Assessment        Right sided pain      3. Menorrhagia with regular cycle    4. Pelvic pain    5. SWAPNIL (stress urinary incontinence, female)    6. Rectocele                      Plan:      Plan      TLH   bilateral fallopian tube.  right S&O  Mid urethra sling    post repair and cystoscopy   Long discussion with the pt and her  regarding the surgery as well as R&B she is ready to proceed   consents are signed in detail

## 2023-11-03 LAB — RPR: NON REACTIVE

## 2023-11-08 ENCOUNTER — OUTSIDE PLACE OF SERVICE (OUTPATIENT)
Dept: OBSTETRICS AND GYNECOLOGY | Facility: CLINIC | Age: 43
End: 2023-11-08

## 2023-11-08 ENCOUNTER — OUTSIDE PLACE OF SERVICE (OUTPATIENT)
Dept: OBSTETRICS AND GYNECOLOGY | Facility: CLINIC | Age: 43
End: 2023-11-08
Payer: COMMERCIAL

## 2023-11-08 PROCEDURE — 58571 PR LAPAROSCOPY W TOT HYSTERECTUTERUS <=250 GRAM  W TUBE/OVARY: ICD-10-PCS | Mod: 80,,, | Performed by: OBSTETRICS & GYNECOLOGY

## 2023-11-08 PROCEDURE — 57288 REPAIR BLADDER DEFECT: CPT | Mod: 51,,, | Performed by: OBSTETRICS & GYNECOLOGY

## 2023-11-08 PROCEDURE — 57250 PR POST COLPORRHAPHY,RECTUM/VAGINA: ICD-10-PCS | Mod: 80,51,, | Performed by: OBSTETRICS & GYNECOLOGY

## 2023-11-08 PROCEDURE — 57250 PR POST COLPORRHAPHY,RECTUM/VAGINA: ICD-10-PCS | Mod: 51,,, | Performed by: OBSTETRICS & GYNECOLOGY

## 2023-11-08 PROCEDURE — 58571 PR LAPAROSCOPY W TOT HYSTERECTUTERUS <=250 GRAM  W TUBE/OVARY: ICD-10-PCS | Mod: ,,, | Performed by: OBSTETRICS & GYNECOLOGY

## 2023-11-08 PROCEDURE — 58571 TLH W/T/O 250 G OR LESS: CPT | Mod: ,,, | Performed by: OBSTETRICS & GYNECOLOGY

## 2023-11-08 PROCEDURE — 57288 PR SLING OPER STRES INCONTINENCE: ICD-10-PCS | Mod: 80,51,, | Performed by: OBSTETRICS & GYNECOLOGY

## 2023-11-08 PROCEDURE — 57288 REPAIR BLADDER DEFECT: CPT | Mod: 80,51,, | Performed by: OBSTETRICS & GYNECOLOGY

## 2023-11-08 PROCEDURE — 57250 REPAIR RECTUM & VAGINA: CPT | Mod: 51,,, | Performed by: OBSTETRICS & GYNECOLOGY

## 2023-11-08 PROCEDURE — 58571 TLH W/T/O 250 G OR LESS: CPT | Mod: 80,,, | Performed by: OBSTETRICS & GYNECOLOGY

## 2023-11-08 PROCEDURE — 57250 REPAIR RECTUM & VAGINA: CPT | Mod: 80,51,, | Performed by: OBSTETRICS & GYNECOLOGY

## 2023-11-08 PROCEDURE — 57288 PR SLING OPER STRES INCONTINENCE: ICD-10-PCS | Mod: 51,,, | Performed by: OBSTETRICS & GYNECOLOGY

## 2023-11-09 ENCOUNTER — TELEPHONE (OUTPATIENT)
Dept: OBSTETRICS AND GYNECOLOGY | Facility: CLINIC | Age: 43
End: 2023-11-09
Payer: COMMERCIAL

## 2023-11-09 LAB
BASOPHILS NFR BLD: 0.4 % (ref 0–3)
EOSINOPHIL NFR BLD: 0.1 % (ref 1–3)
ERYTHROCYTE [DISTWIDTH] IN BLOOD BY AUTOMATED COUNT: 13.2 % (ref 12.5–18)
HCT VFR BLD AUTO: 33.7 % (ref 37–47)
HGB BLD-MCNC: 11.3 G/DL (ref 12–16)
LYMPHOCYTES NFR BLD: 11.3 % (ref 25–40)
MCH RBC QN AUTO: 26.8 PG (ref 27–31.2)
MCHC RBC AUTO-ENTMCNC: 33.5 G/DL (ref 31.8–35.4)
MCV RBC AUTO: 79.9 FL (ref 80–97)
MONOCYTES NFR BLD: 7.2 % (ref 1–15)
NEUTROPHILS # BLD AUTO: 11.58 10*3/UL (ref 1.8–7.7)
NEUTROPHILS NFR BLD: 80.6 % (ref 37–80)
NUCLEATED RED BLOOD CELLS: 0 %
PLATELETS: 243 10*3/UL (ref 142–424)
RBC # BLD AUTO: 4.22 10*6/UL (ref 4.2–5.4)
WBC # BLD: 14.4 10*3/UL (ref 4.6–10.2)

## 2023-11-09 NOTE — TELEPHONE ENCOUNTER
Spoke to patient postoperatively, she states she is doing well and has been voiding. She has not yet had a bowel movement but has taken colace and some miralax to try to help. She has been passing gas. Advised I will check in with her again tomorrow just to see how things are going before the weekend.

## 2023-11-10 ENCOUNTER — PATIENT MESSAGE (OUTPATIENT)
Dept: OBSTETRICS AND GYNECOLOGY | Facility: CLINIC | Age: 43
End: 2023-11-10
Payer: COMMERCIAL

## 2023-11-10 LAB — SPECIMEN TO PATHOLOGY: NORMAL

## 2023-11-13 ENCOUNTER — PATIENT MESSAGE (OUTPATIENT)
Dept: OBSTETRICS AND GYNECOLOGY | Facility: CLINIC | Age: 43
End: 2023-11-13
Payer: COMMERCIAL

## 2023-11-15 ENCOUNTER — CLINICAL SUPPORT (OUTPATIENT)
Dept: OBSTETRICS AND GYNECOLOGY | Facility: CLINIC | Age: 43
End: 2023-11-15
Payer: COMMERCIAL

## 2023-11-15 ENCOUNTER — TELEPHONE (OUTPATIENT)
Dept: OBSTETRICS AND GYNECOLOGY | Facility: CLINIC | Age: 43
End: 2023-11-15

## 2023-11-15 VITALS
SYSTOLIC BLOOD PRESSURE: 126 MMHG | HEART RATE: 64 BPM | DIASTOLIC BLOOD PRESSURE: 80 MMHG | BODY MASS INDEX: 32.54 KG/M2 | WEIGHT: 214 LBS

## 2023-11-15 DIAGNOSIS — Z98.890 POST-OPERATIVE STATE: Primary | ICD-10-CM

## 2023-11-15 PROCEDURE — 99211 PR OFFICE/OUTPT VISIT, EST, LEVL I: ICD-10-PCS | Mod: S$GLB,,, | Performed by: OBSTETRICS & GYNECOLOGY

## 2023-11-15 PROCEDURE — 99211 OFF/OP EST MAY X REQ PHY/QHP: CPT | Mod: S$GLB,,, | Performed by: OBSTETRICS & GYNECOLOGY

## 2023-11-15 NOTE — TELEPHONE ENCOUNTER
She is doing very well and quite pleased  she is voiding fine and doing much better than she expected

## 2023-11-15 NOTE — PROGRESS NOTES
Patient came in today for 1 week post op visit. She had a hysterectomy, rectocele repair, and MU sling. She reports having some soreness but nothing that is causing any major discomfort, she states she feels this mostly with bowel movements. She states she did an enema over the weekend as she had not had a bowel movement since surgery, she reports being able to have bowel movements since doing the enema. Patient also reports voiding well with no issues. Her incisions look good today, stitches removed. She is not longer taking any of the pain medication. Overall patient reports she is doing well since surgery and not having any problems or concerns. Scheduled patient for 6 week follow up visit and advised to reach out to us sooner if she needs anything before then.

## 2023-12-15 ENCOUNTER — OFFICE VISIT (OUTPATIENT)
Dept: OBSTETRICS AND GYNECOLOGY | Facility: CLINIC | Age: 43
End: 2023-12-15
Payer: COMMERCIAL

## 2023-12-15 VITALS
BODY MASS INDEX: 33.59 KG/M2 | HEIGHT: 67 IN | WEIGHT: 214 LBS | DIASTOLIC BLOOD PRESSURE: 87 MMHG | HEART RATE: 62 BPM | SYSTOLIC BLOOD PRESSURE: 133 MMHG

## 2023-12-15 DIAGNOSIS — Z98.890 POST-OPERATIVE STATE: Primary | ICD-10-CM

## 2023-12-15 PROCEDURE — 1159F PR MEDICATION LIST DOCUMENTED IN MEDICAL RECORD: ICD-10-PCS | Mod: CPTII,S$GLB,, | Performed by: OBSTETRICS & GYNECOLOGY

## 2023-12-15 PROCEDURE — 3075F PR MOST RECENT SYSTOLIC BLOOD PRESS GE 130-139MM HG: ICD-10-PCS | Mod: CPTII,S$GLB,, | Performed by: OBSTETRICS & GYNECOLOGY

## 2023-12-15 PROCEDURE — 3079F PR MOST RECENT DIASTOLIC BLOOD PRESSURE 80-89 MM HG: ICD-10-PCS | Mod: CPTII,S$GLB,, | Performed by: OBSTETRICS & GYNECOLOGY

## 2023-12-15 PROCEDURE — 99024 POSTOP FOLLOW-UP VISIT: CPT | Mod: S$GLB,,, | Performed by: OBSTETRICS & GYNECOLOGY

## 2023-12-15 PROCEDURE — 3075F SYST BP GE 130 - 139MM HG: CPT | Mod: CPTII,S$GLB,, | Performed by: OBSTETRICS & GYNECOLOGY

## 2023-12-15 PROCEDURE — 3079F DIAST BP 80-89 MM HG: CPT | Mod: CPTII,S$GLB,, | Performed by: OBSTETRICS & GYNECOLOGY

## 2023-12-15 PROCEDURE — 1159F MED LIST DOCD IN RCRD: CPT | Mod: CPTII,S$GLB,, | Performed by: OBSTETRICS & GYNECOLOGY

## 2023-12-15 PROCEDURE — 99024 PR POST-OP FOLLOW-UP VISIT: ICD-10-PCS | Mod: S$GLB,,, | Performed by: OBSTETRICS & GYNECOLOGY

## 2023-12-15 NOTE — PROGRESS NOTES
Subjective:       Patient ID: Letty Olmedo is a 43 y.o. female.    Chief Complaint:  Post-op Evaluation      History of Present Illness  She is doing well.  No new health issues,  No abnormal bleeding, No GI or Gu concerns,  No dyspareunia  Status post .TLH post repair and mid urethra sling         GYN & OB History  Patient's last menstrual period was 10/27/2023.     Date of Last Pap: No result found    OB History   No obstetric history on file.       Review of Systems  Review of Systems          Objective:    Physical Exam:   Constitutional: She appears well-developed.             Abdominal: Soft.   Incisions dry and intact     Genitourinary:    Vagina normal.      Pelvic exam was performed with patient supine.     Labial bartholins normal.There is no tenderness or lesion on the right labia. There is no tenderness or lesion on the left labia. Cervix is absent.Uterus is absent.    Genitourinary Comments: The vaginal cuff is intact and healing nicely                 Neurological: She is alert.     Psychiatric: She has a normal mood and affect. Her speech is normal and behavior is normal.        Assessment:      No diagnosis found.             Plan:             Pap discussed will return for her annual     Pt is aware we call all results. If she does not hear from our office regarding her result within a week of having a study or procedure performed she is to call the office so that we can research the result for her.  Normal activities discussed.   She is to return for any reason.      Chaperone was present

## 2024-02-08 DIAGNOSIS — R41.9 COGNITIVE COMPLAINTS: Primary | ICD-10-CM

## 2024-02-08 RX ORDER — ESCITALOPRAM OXALATE 20 MG/1
20 TABLET ORAL
Qty: 30 TABLET | Refills: 0 | Status: SHIPPED | OUTPATIENT
Start: 2024-02-08

## 2024-07-16 ENCOUNTER — OFFICE VISIT (OUTPATIENT)
Dept: OBSTETRICS AND GYNECOLOGY | Facility: CLINIC | Age: 44
End: 2024-07-16
Payer: COMMERCIAL

## 2024-07-16 VITALS
HEART RATE: 54 BPM | BODY MASS INDEX: 34.53 KG/M2 | DIASTOLIC BLOOD PRESSURE: 85 MMHG | SYSTOLIC BLOOD PRESSURE: 130 MMHG | HEIGHT: 67 IN | WEIGHT: 220 LBS

## 2024-07-16 DIAGNOSIS — R10.2 PELVIC PAIN: ICD-10-CM

## 2024-07-16 DIAGNOSIS — Z12.31 SCREENING MAMMOGRAM FOR BREAST CANCER: ICD-10-CM

## 2024-07-16 DIAGNOSIS — R23.2 HOT FLASHES: ICD-10-CM

## 2024-07-16 DIAGNOSIS — Z01.419 WELL WOMAN EXAM WITH ROUTINE GYNECOLOGICAL EXAM: Primary | ICD-10-CM

## 2024-07-16 LAB
ALBUMIN SERPL-MCNC: 4.3 G/DL (ref 3.5–5.2)
ALBUMIN/GLOB SERPL ELPH: 1.5 {RATIO} (ref 1–2.7)
ALP ISOS SERPL LEV INH-CCNC: 75 U/L (ref 35–105)
ALT (SGPT): 17 U/L (ref 0–33)
ANION GAP SERPL CALC-SCNC: 11 MMOL/L (ref 8–17)
AST SERPL-CCNC: 15 U/L (ref 0–32)
BILIRUBIN, TOTAL: 0.39 MG/DL (ref 0–1.2)
BUN/CREAT SERPL: 15.1 (ref 6–20)
CALCIUM SERPL-MCNC: 9 MG/DL (ref 8.6–10.2)
CARBON DIOXIDE, CO2: 26 MMOL/L (ref 22–29)
CHLORIDE: 104 MMOL/L (ref 98–107)
CREAT SERPL-MCNC: 0.57 MG/DL (ref 0.5–0.9)
E2: 147 PG/ML
FSH: 3.87 MIU/ML
GFR ESTIMATION: 115.57 ML/MIN/1.73M2
GLOBULIN: 2.8 G/DL (ref 1.5–4.5)
GLUCOSE: 85 MG/DL (ref 74–106)
LH: 7.02 MIU/ML
POTASSIUM: 3.9 MMOL/L (ref 3.5–5.1)
PROT SNV-MCNC: 7.1 G/DL (ref 6.4–8.3)
SODIUM: 141 MMOL/L (ref 136–145)
T4, FREE: 1.01 NG/DL (ref 0.93–1.7)
TSH SERPL DL<=0.005 MIU/L-ACNC: 1.17 UIU/ML (ref 0.27–4.2)
UREA NITROGEN (BUN): 8.6 MG/DL (ref 6–20)

## 2024-07-16 PROCEDURE — 1159F MED LIST DOCD IN RCRD: CPT | Mod: CPTII,S$GLB,,

## 2024-07-16 PROCEDURE — 99459 PELVIC EXAMINATION: CPT | Mod: S$GLB,,,

## 2024-07-16 PROCEDURE — 3075F SYST BP GE 130 - 139MM HG: CPT | Mod: CPTII,S$GLB,,

## 2024-07-16 PROCEDURE — 99396 PREV VISIT EST AGE 40-64: CPT | Mod: S$GLB,,,

## 2024-07-16 PROCEDURE — 3079F DIAST BP 80-89 MM HG: CPT | Mod: CPTII,S$GLB,,

## 2024-07-16 PROCEDURE — 1160F RVW MEDS BY RX/DR IN RCRD: CPT | Mod: CPTII,S$GLB,,

## 2024-07-16 PROCEDURE — 3008F BODY MASS INDEX DOCD: CPT | Mod: CPTII,S$GLB,,

## 2024-07-16 NOTE — PROGRESS NOTES
"  Subjective:      Patient ID: Letty Olmedo is a 44 y.o. female who presents for evaluation today.    Chief Complaint:    Well Woman      History of Present Illness  HPI  Annual Exam (Premenopausal) - Patient presents for annual exam. The patient also has complaints of right sided pelvic pain, hot flashes. The patient is sexually active. GYN screening history: last pap: was normal and last mammogram: was normal. The patient wears seatbelts: yes. The patient participates in regular exercise: yes. Has the patient ever been transfused or tattooed?: yes. The patient reports that there is not domestic violence in her life. She had a hysterectomy with urethral sling, rectocele repair last year. Reports leaving one ovary. She is no longer leaking, BMs are regular. She does report right sided pelvic pain, occurring a few times a week, described as "sharp" but disappears shortly.  She does note some feelings of hot flashes and other times coldness. Occurring once per day. She also notes hair loss. She sees Jillian Beck for her primary care.     GYN History  Patient's last menstrual period was 10/27/2023.   Date of Last Pap: Up to date in accordance with ASCCP guidelines Pap smear schedule reviewed with patient Result history reviewed with patient Pap smear not performed    VITALS  /85   Pulse (!) 54   Ht 5' 7" (1.702 m)   Wt 99.8 kg (220 lb)   LMP 10/27/2023   BMI 34.46 kg/m²   Weight: 99.8 kg (220 lb)   Height: 5' 7" (170.2 cm)     PAST MEDICAL HISTORY  Past Medical History:   Diagnosis Date    Anxiety disorder, unspecified     Hashimoto's thyroiditis     Seizures        PAST SURGICAL HISTORY  Past Surgical History:   Procedure Laterality Date    BLADDER SUSPENSION       SECTION      COLONOSCOPY      HEMORRHOID SURGERY      HYSTERECTOMY      REPAIR OF RECTOCELE      TONSILLECTOMY      TUBAL LIGATION      UMBILICAL HERNIA REPAIR         SOCIAL HISTORY  Social History     Tobacco Use   Smoking Status " Former    Types: Cigarettes   Smokeless Tobacco Never   ,   Social History     Substance and Sexual Activity   Alcohol Use Yes        MEDICATIONS  Outpatient Medications Marked as Taking for the 7/16/24 encounter (Office Visit) with Mary Rogers NP   Medication Sig Dispense Refill    EScitalopram oxalate (LEXAPRO) 20 MG tablet TAKE 1 TABLET(20 MG) BY MOUTH EVERY DAY 30 tablet 0         Review of Systems   Review of Systems   Constitutional:  Negative for activity change.   Eyes:  Negative for visual disturbance.   Respiratory:  Negative for shortness of breath.    Cardiovascular:  Negative for chest pain.   Gastrointestinal:  Negative for abdominal pain.   Genitourinary:  Positive for hot flashes and pelvic pain. Negative for vaginal bleeding.        No abnormal vaginal bleeding   Musculoskeletal:  Negative for back pain.   Integumentary:  Positive for hair changes. Negative for rash and breast mass.   Neurological:  Negative for numbness.   Psychiatric/Behavioral:          No mood disturbance or changes    Breast: Negative for mass          Objective:     Physical Exam:   Constitutional: She is oriented to person, place, and time. She appears well-developed.    HENT:   Head: Normocephalic.     Neck: Trachea normal. No thyromegaly present.    Cardiovascular:  Normal rate, regular rhythm and normal heart sounds.             Pulmonary/Chest: Effort normal and breath sounds normal. Right breast exhibits no mass, no nipple discharge and no skin change. Left breast exhibits no mass, no nipple discharge and no skin change.   Mild fibrocystic changes    During the exam self breast exam discussed         Abdominal: Soft. There is no abdominal tenderness. There is no rigidity and no guarding.     Genitourinary:    Vagina normal.      Pelvic exam was performed with patient supine.   The external female genitalia was normal.     Labial bartholins normal.There is no lesion on the right labia. There is no lesion on the  left labia. Right adnexum displays tenderness. Right adnexum displays no mass. Left adnexum displays no mass and no tenderness. Cervix is absent.Uterus is absent.              Lymphadenopathy:        Head (right side): No submental and no submandibular adenopathy present.        Head (left side): No submental and no submandibular adenopathy present.     She has no cervical adenopathy.    Neurological: She is alert and oriented to person, place, and time.    Skin: Skin is warm.    Psychiatric: She has a normal mood and affect. Her speech is normal and behavior is normal. Thought content normal.          Assessment:     Well woman exam with routine gynecological exam    Pelvic pain  -     US OB/GYN Procedure ([facility]); Future    Hot flashes  -     Luteinizing Hormone; Future; Expected date: 07/16/2024  -     Follicle Stimulating Hormone; Future; Expected date: 07/16/2024  -     Estradiol; Future; Expected date: 07/16/2024  -     T4, Free; Future; Expected date: 07/16/2024  -     TSH; Future; Expected date: 07/16/2024  -     Comprehensive Metabolic Panel; Future; Expected date: 07/16/2024    Screening mammogram for breast cancer  -     Mammo Digital Screening Bilat; Future; Expected date: 07/16/2024         Plan:     Patient instructed to contact the clinic should any questions or concerns arise prior to her next office visit. Patient is happy with the plan of care at this time, verbalizes understanding and denies outstanding questions.      Mammogram  Self-breast exams  Consider annual health panel with Primary Care if not done  If you don't hear from the office regarding results within 1 week, please call  Follow up in 1 year for annual or sooner as needed  Chaperone present for exam

## 2024-07-19 ENCOUNTER — PATIENT MESSAGE (OUTPATIENT)
Dept: OBSTETRICS AND GYNECOLOGY | Facility: CLINIC | Age: 44
End: 2024-07-19
Payer: COMMERCIAL

## 2024-07-19 ENCOUNTER — PROCEDURE VISIT (OUTPATIENT)
Dept: OBSTETRICS AND GYNECOLOGY | Facility: CLINIC | Age: 44
End: 2024-07-19
Payer: COMMERCIAL

## 2024-07-19 DIAGNOSIS — R10.2 PELVIC PAIN: ICD-10-CM

## 2024-12-12 ENCOUNTER — DOCUMENTATION ONLY (OUTPATIENT)
Dept: OBSTETRICS AND GYNECOLOGY | Facility: CLINIC | Age: 44
End: 2024-12-12
Payer: COMMERCIAL

## 2025-06-23 DIAGNOSIS — B96.89 BACTERIAL VAGINOSIS: Primary | ICD-10-CM

## 2025-06-23 DIAGNOSIS — N76.0 BACTERIAL VAGINOSIS: Primary | ICD-10-CM

## 2025-06-23 RX ORDER — TINIDAZOLE 500 MG/1
2 TABLET ORAL
Qty: 8 TABLET | Refills: 0 | Status: SHIPPED | OUTPATIENT
Start: 2025-06-23 | End: 2025-06-25

## 2025-06-23 NOTE — TELEPHONE ENCOUNTER
Patient c/o vaginal odor and discharge. Advised we can treat with antibiotics but if symptoms worsen or do not improve then she is to let us know. She verbalized understanding.   Patient request refill. Allergies reviewed. Pharmacy up to date. Medication pending. Please approve.

## 2025-06-23 NOTE — TELEPHONE ENCOUNTER
Copied from CRM #[id number]. Topic: General Inquiry - Patient Advice  >> Jun 23, 2025  2:07 PM Mitra wrote:  Patient is calling to receive a call back at .[phone]. Wanting to speak with nurse regarding possible infection.